# Patient Record
Sex: MALE | Race: BLACK OR AFRICAN AMERICAN | Employment: OTHER | URBAN - METROPOLITAN AREA
[De-identification: names, ages, dates, MRNs, and addresses within clinical notes are randomized per-mention and may not be internally consistent; named-entity substitution may affect disease eponyms.]

---

## 2019-03-06 ENCOUNTER — APPOINTMENT (OUTPATIENT)
Dept: CT IMAGING | Facility: CLINIC | Age: 64
End: 2019-03-06
Attending: EMERGENCY MEDICINE
Payer: COMMERCIAL

## 2019-03-06 ENCOUNTER — HOSPITAL ENCOUNTER (INPATIENT)
Facility: CLINIC | Age: 64
LOS: 1 days | Discharge: HOME | End: 2019-03-08
Attending: EMERGENCY MEDICINE | Admitting: STUDENT IN AN ORGANIZED HEALTH CARE EDUCATION/TRAINING PROGRAM
Payer: COMMERCIAL

## 2019-03-06 ENCOUNTER — APPOINTMENT (OUTPATIENT)
Dept: GENERAL RADIOLOGY | Facility: CLINIC | Age: 64
End: 2019-03-06
Attending: EMERGENCY MEDICINE
Payer: COMMERCIAL

## 2019-03-06 DIAGNOSIS — E11.65 UNCONTROLLED TYPE 2 DIABETES MELLITUS WITH HYPERGLYCEMIA (H): ICD-10-CM

## 2019-03-06 DIAGNOSIS — E78.00 HIGH CHOLESTEROL: Primary | ICD-10-CM

## 2019-03-06 DIAGNOSIS — E86.0 DEHYDRATION: ICD-10-CM

## 2019-03-06 DIAGNOSIS — R07.89 ATYPICAL CHEST PAIN: ICD-10-CM

## 2019-03-06 DIAGNOSIS — I44.7 NEW ONSET LEFT BUNDLE BRANCH BLOCK (LBBB): ICD-10-CM

## 2019-03-06 PROBLEM — R73.9 HEMOGLOBIN A1C BETWEEN 7.0% AND 9.0%: Status: ACTIVE | Noted: 2019-03-06

## 2019-03-06 LAB
ALBUMIN SERPL-MCNC: 3.8 G/DL (ref 3.4–5)
ALP SERPL-CCNC: 84 U/L (ref 40–150)
ALT SERPL W P-5'-P-CCNC: 25 U/L (ref 0–70)
ANION GAP SERPL CALCULATED.3IONS-SCNC: 10 MMOL/L (ref 3–14)
ANION GAP SERPL CALCULATED.3IONS-SCNC: 14 MMOL/L (ref 6–17)
APTT PPP: 22 SEC (ref 22–37)
AST SERPL W P-5'-P-CCNC: 15 U/L (ref 0–45)
BASOPHILS # BLD AUTO: 0.1 10E9/L (ref 0–0.2)
BASOPHILS NFR BLD AUTO: 1 %
BILIRUB SERPL-MCNC: 0.9 MG/DL (ref 0.2–1.3)
BUN SERPL-MCNC: 10 MG/DL (ref 7–30)
BUN SERPL-MCNC: 11 MG/DL (ref 7–30)
CA-I BLD-SCNC: 4.5 MG/DL (ref 4.4–5.2)
CALCIUM SERPL-MCNC: 8.7 MG/DL (ref 8.5–10.1)
CHLORIDE BLD-SCNC: 103 MMOL/L (ref 94–109)
CHLORIDE SERPL-SCNC: 105 MMOL/L (ref 94–109)
CO2 BLD-SCNC: 25 MMOL/L (ref 20–32)
CO2 SERPL-SCNC: 24 MMOL/L (ref 20–32)
CREAT BLD-MCNC: 0.8 MG/DL (ref 0.66–1.25)
CREAT SERPL-MCNC: 0.92 MG/DL (ref 0.66–1.25)
DIFFERENTIAL METHOD BLD: NORMAL
EOSINOPHIL # BLD AUTO: 0.1 10E9/L (ref 0–0.7)
EOSINOPHIL NFR BLD AUTO: 1.2 %
ERYTHROCYTE [DISTWIDTH] IN BLOOD BY AUTOMATED COUNT: 12.6 % (ref 10–15)
GFR SERPL CREATININE-BSD FRML MDRD: 89 ML/MIN/{1.73_M2}
GFR SERPL CREATININE-BSD FRML MDRD: >90 ML/MIN/{1.73_M2}
GLUCOSE BLD-MCNC: 231 MG/DL (ref 70–99)
GLUCOSE BLDC GLUCOMTR-MCNC: 216 MG/DL (ref 70–99)
GLUCOSE SERPL-MCNC: 236 MG/DL (ref 70–99)
HBA1C MFR BLD: 6.5 % (ref 0–5.6)
HCT VFR BLD AUTO: 45.6 % (ref 40–53)
HCT VFR BLD CALC: 44 %PCV (ref 40–53)
HGB BLD CALC-MCNC: 15 G/DL (ref 13.3–17.7)
HGB BLD-MCNC: 15.5 G/DL (ref 13.3–17.7)
IMM GRANULOCYTES # BLD: 0 10E9/L (ref 0–0.4)
IMM GRANULOCYTES NFR BLD: 0.4 %
INR PPP: 1.01 (ref 0.86–1.14)
INTERPRETATION ECG - MUSE: NORMAL
LACTATE BLD-SCNC: 3.5 MMOL/L (ref 0.7–2)
LIPASE SERPL-CCNC: 109 U/L (ref 73–393)
LYMPHOCYTES # BLD AUTO: 1.8 10E9/L (ref 0.8–5.3)
LYMPHOCYTES NFR BLD AUTO: 22.1 %
MAGNESIUM SERPL-MCNC: 2.2 MG/DL (ref 1.6–2.3)
MCH RBC QN AUTO: 29.7 PG (ref 26.5–33)
MCHC RBC AUTO-ENTMCNC: 34 G/DL (ref 31.5–36.5)
MCV RBC AUTO: 87 FL (ref 78–100)
MONOCYTES # BLD AUTO: 0.4 10E9/L (ref 0–1.3)
MONOCYTES NFR BLD AUTO: 4.5 %
NEUTROPHILS # BLD AUTO: 5.7 10E9/L (ref 1.6–8.3)
NEUTROPHILS NFR BLD AUTO: 70.8 %
NRBC # BLD AUTO: 0 10*3/UL
NRBC BLD AUTO-RTO: 0 /100
PHOSPHATE SERPL-MCNC: 2.6 MG/DL (ref 2.5–4.5)
PLATELET # BLD AUTO: 187 10E9/L (ref 150–450)
POTASSIUM BLD-SCNC: 3.1 MMOL/L (ref 3.4–5.3)
POTASSIUM SERPL-SCNC: 3.3 MMOL/L (ref 3.4–5.3)
PROT SERPL-MCNC: 7.3 G/DL (ref 6.8–8.8)
RBC # BLD AUTO: 5.22 10E12/L (ref 4.4–5.9)
SODIUM BLD-SCNC: 142 MMOL/L (ref 133–144)
SODIUM SERPL-SCNC: 139 MMOL/L (ref 133–144)
TROPONIN I SERPL-MCNC: <0.015 UG/L (ref 0–0.04)
TSH SERPL DL<=0.005 MIU/L-ACNC: 2.74 MU/L (ref 0.4–4)
WBC # BLD AUTO: 8 10E9/L (ref 4–11)

## 2019-03-06 PROCEDURE — 84484 ASSAY OF TROPONIN QUANT: CPT | Performed by: EMERGENCY MEDICINE

## 2019-03-06 PROCEDURE — 84484 ASSAY OF TROPONIN QUANT: CPT

## 2019-03-06 PROCEDURE — 80047 BASIC METABLC PNL IONIZED CA: CPT

## 2019-03-06 PROCEDURE — 99285 EMERGENCY DEPT VISIT HI MDM: CPT | Mod: 25

## 2019-03-06 PROCEDURE — 85025 COMPLETE CBC W/AUTO DIFF WBC: CPT | Performed by: EMERGENCY MEDICINE

## 2019-03-06 PROCEDURE — 84100 ASSAY OF PHOSPHORUS: CPT | Performed by: EMERGENCY MEDICINE

## 2019-03-06 PROCEDURE — 83036 HEMOGLOBIN GLYCOSYLATED A1C: CPT | Performed by: EMERGENCY MEDICINE

## 2019-03-06 PROCEDURE — 80053 COMPREHEN METABOLIC PANEL: CPT | Performed by: EMERGENCY MEDICINE

## 2019-03-06 PROCEDURE — 70450 CT HEAD/BRAIN W/O DYE: CPT

## 2019-03-06 PROCEDURE — 25000128 H RX IP 250 OP 636: Performed by: EMERGENCY MEDICINE

## 2019-03-06 PROCEDURE — 85610 PROTHROMBIN TIME: CPT | Performed by: EMERGENCY MEDICINE

## 2019-03-06 PROCEDURE — 96360 HYDRATION IV INFUSION INIT: CPT

## 2019-03-06 PROCEDURE — 83690 ASSAY OF LIPASE: CPT | Performed by: EMERGENCY MEDICINE

## 2019-03-06 PROCEDURE — 00000146 ZZHCL STATISTIC GLUCOSE BY METER IP

## 2019-03-06 PROCEDURE — 83605 ASSAY OF LACTIC ACID: CPT | Performed by: EMERGENCY MEDICINE

## 2019-03-06 PROCEDURE — 83735 ASSAY OF MAGNESIUM: CPT | Performed by: EMERGENCY MEDICINE

## 2019-03-06 PROCEDURE — 85730 THROMBOPLASTIN TIME PARTIAL: CPT | Performed by: EMERGENCY MEDICINE

## 2019-03-06 PROCEDURE — 71046 X-RAY EXAM CHEST 2 VIEWS: CPT

## 2019-03-06 PROCEDURE — 84443 ASSAY THYROID STIM HORMONE: CPT | Performed by: EMERGENCY MEDICINE

## 2019-03-06 PROCEDURE — 96361 HYDRATE IV INFUSION ADD-ON: CPT

## 2019-03-06 PROCEDURE — 93005 ELECTROCARDIOGRAM TRACING: CPT

## 2019-03-06 PROCEDURE — 85014 HEMATOCRIT: CPT

## 2019-03-06 RX ADMIN — SODIUM CHLORIDE 1000 ML: 9 INJECTION, SOLUTION INTRAVENOUS at 21:59

## 2019-03-06 SDOH — HEALTH STABILITY: MENTAL HEALTH: HOW OFTEN DO YOU HAVE A DRINK CONTAINING ALCOHOL?: NEVER

## 2019-03-06 ASSESSMENT — ENCOUNTER SYMPTOMS
VOMITING: 1
DIAPHORESIS: 1
NAUSEA: 1
ABDOMINAL PAIN: 0
WEAKNESS: 1
HEADACHES: 0
DIARRHEA: 0
CONSTIPATION: 0
SHORTNESS OF BREATH: 0

## 2019-03-07 ENCOUNTER — APPOINTMENT (OUTPATIENT)
Dept: CARDIOLOGY | Facility: CLINIC | Age: 64
End: 2019-03-07
Attending: STUDENT IN AN ORGANIZED HEALTH CARE EDUCATION/TRAINING PROGRAM
Payer: COMMERCIAL

## 2019-03-07 ENCOUNTER — APPOINTMENT (OUTPATIENT)
Dept: NUCLEAR MEDICINE | Facility: CLINIC | Age: 64
End: 2019-03-07
Attending: INTERNAL MEDICINE
Payer: COMMERCIAL

## 2019-03-07 PROBLEM — R07.9 CHEST PAIN: Status: ACTIVE | Noted: 2019-03-07

## 2019-03-07 LAB
ALBUMIN UR-MCNC: NEGATIVE MG/DL
AMPHETAMINES UR QL SCN: NEGATIVE
APPEARANCE UR: ABNORMAL
BARBITURATES UR QL: NEGATIVE
BENZODIAZ UR QL: NEGATIVE
BILIRUB UR QL STRIP: NEGATIVE
CANNABINOIDS UR QL SCN: NEGATIVE
CHOLEST SERPL-MCNC: 174 MG/DL
COCAINE UR QL: NEGATIVE
COLOR UR AUTO: YELLOW
GLUCOSE BLDC GLUCOMTR-MCNC: 101 MG/DL (ref 70–99)
GLUCOSE BLDC GLUCOMTR-MCNC: 114 MG/DL (ref 70–99)
GLUCOSE BLDC GLUCOMTR-MCNC: 156 MG/DL (ref 70–99)
GLUCOSE UR STRIP-MCNC: 50 MG/DL
HDLC SERPL-MCNC: 41 MG/DL
HGB UR QL STRIP: NEGATIVE
INTERPRETATION ECG - MUSE: NORMAL
KETONES UR STRIP-MCNC: 20 MG/DL
LDLC SERPL CALC-MCNC: 117 MG/DL
LEUKOCYTE ESTERASE UR QL STRIP: NEGATIVE
MUCOUS THREADS #/AREA URNS LPF: PRESENT /LPF
NITRATE UR QL: NEGATIVE
NONHDLC SERPL-MCNC: 133 MG/DL
OPIATES UR QL SCN: NEGATIVE
PCP UR QL SCN: NEGATIVE
PH UR STRIP: 7 PH (ref 5–7)
POTASSIUM SERPL-SCNC: 4.1 MMOL/L (ref 3.4–5.3)
RBC #/AREA URNS AUTO: 1 /HPF (ref 0–2)
SOURCE: ABNORMAL
SP GR UR STRIP: 1.02 (ref 1–1.03)
SQUAMOUS #/AREA URNS AUTO: 1 /HPF (ref 0–1)
TRIGL SERPL-MCNC: 82 MG/DL
TROPONIN I BLD-MCNC: 0 UG/L (ref 0–0.08)
TROPONIN I SERPL-MCNC: <0.015 UG/L (ref 0–0.04)
UROBILINOGEN UR STRIP-MCNC: NEGATIVE MG/DL (ref 0–2)
WBC #/AREA URNS AUTO: 4 /HPF (ref 0–5)

## 2019-03-07 PROCEDURE — 36415 COLL VENOUS BLD VENIPUNCTURE: CPT | Performed by: STUDENT IN AN ORGANIZED HEALTH CARE EDUCATION/TRAINING PROGRAM

## 2019-03-07 PROCEDURE — 93010 ELECTROCARDIOGRAM REPORT: CPT | Performed by: INTERNAL MEDICINE

## 2019-03-07 PROCEDURE — 00000146 ZZHCL STATISTIC GLUCOSE BY METER IP

## 2019-03-07 PROCEDURE — 96361 HYDRATE IV INFUSION ADD-ON: CPT

## 2019-03-07 PROCEDURE — 25500064 ZZH RX 255 OP 636: Performed by: STUDENT IN AN ORGANIZED HEALTH CARE EDUCATION/TRAINING PROGRAM

## 2019-03-07 PROCEDURE — 78452 HT MUSCLE IMAGE SPECT MULT: CPT

## 2019-03-07 PROCEDURE — 93016 CV STRESS TEST SUPVJ ONLY: CPT | Performed by: INTERNAL MEDICINE

## 2019-03-07 PROCEDURE — 93017 CV STRESS TEST TRACING ONLY: CPT

## 2019-03-07 PROCEDURE — 25000132 ZZH RX MED GY IP 250 OP 250 PS 637: Performed by: STUDENT IN AN ORGANIZED HEALTH CARE EDUCATION/TRAINING PROGRAM

## 2019-03-07 PROCEDURE — 99223 1ST HOSP IP/OBS HIGH 75: CPT | Mod: AI | Performed by: STUDENT IN AN ORGANIZED HEALTH CARE EDUCATION/TRAINING PROGRAM

## 2019-03-07 PROCEDURE — 81001 URINALYSIS AUTO W/SCOPE: CPT | Performed by: EMERGENCY MEDICINE

## 2019-03-07 PROCEDURE — 25800030 ZZH RX IP 258 OP 636: Performed by: EMERGENCY MEDICINE

## 2019-03-07 PROCEDURE — 93005 ELECTROCARDIOGRAM TRACING: CPT

## 2019-03-07 PROCEDURE — 84484 ASSAY OF TROPONIN QUANT: CPT | Performed by: STUDENT IN AN ORGANIZED HEALTH CARE EDUCATION/TRAINING PROGRAM

## 2019-03-07 PROCEDURE — 80307 DRUG TEST PRSMV CHEM ANLYZR: CPT | Performed by: EMERGENCY MEDICINE

## 2019-03-07 PROCEDURE — 80061 LIPID PANEL: CPT | Performed by: STUDENT IN AN ORGANIZED HEALTH CARE EDUCATION/TRAINING PROGRAM

## 2019-03-07 PROCEDURE — 99222 1ST HOSP IP/OBS MODERATE 55: CPT | Mod: 25 | Performed by: INTERNAL MEDICINE

## 2019-03-07 PROCEDURE — A9502 TC99M TETROFOSMIN: HCPCS | Performed by: STUDENT IN AN ORGANIZED HEALTH CARE EDUCATION/TRAINING PROGRAM

## 2019-03-07 PROCEDURE — 40000264 ECHOCARDIOGRAM COMPLETE

## 2019-03-07 PROCEDURE — 93018 CV STRESS TEST I&R ONLY: CPT | Performed by: INTERNAL MEDICINE

## 2019-03-07 PROCEDURE — 34300033 ZZH RX 343: Performed by: STUDENT IN AN ORGANIZED HEALTH CARE EDUCATION/TRAINING PROGRAM

## 2019-03-07 PROCEDURE — 84132 ASSAY OF SERUM POTASSIUM: CPT | Performed by: STUDENT IN AN ORGANIZED HEALTH CARE EDUCATION/TRAINING PROGRAM

## 2019-03-07 PROCEDURE — 25000128 H RX IP 250 OP 636

## 2019-03-07 PROCEDURE — 12000000 ZZH R&B MED SURG/OB

## 2019-03-07 PROCEDURE — 93306 TTE W/DOPPLER COMPLETE: CPT | Mod: 26 | Performed by: INTERNAL MEDICINE

## 2019-03-07 PROCEDURE — 78452 HT MUSCLE IMAGE SPECT MULT: CPT | Mod: 26 | Performed by: INTERNAL MEDICINE

## 2019-03-07 PROCEDURE — 25000132 ZZH RX MED GY IP 250 OP 250 PS 637: Performed by: INTERNAL MEDICINE

## 2019-03-07 RX ORDER — GABAPENTIN 300 MG/1
CAPSULE ORAL
Status: ON HOLD | COMMUNITY
Start: 2018-11-07 | End: 2019-03-07

## 2019-03-07 RX ORDER — REGADENOSON 0.08 MG/ML
INJECTION, SOLUTION INTRAVENOUS
Status: COMPLETED
Start: 2019-03-07 | End: 2019-03-07

## 2019-03-07 RX ORDER — POTASSIUM CHLORIDE 29.8 MG/ML
20 INJECTION INTRAVENOUS
Status: DISCONTINUED | OUTPATIENT
Start: 2019-03-07 | End: 2019-03-07 | Stop reason: CLARIF

## 2019-03-07 RX ORDER — IBUPROFEN 800 MG/1
800 TABLET, FILM COATED ORAL
Status: ON HOLD | COMMUNITY
Start: 2018-09-12 | End: 2019-03-07

## 2019-03-07 RX ORDER — ACETAMINOPHEN 500 MG
1000 TABLET ORAL EVERY 8 HOURS PRN
Status: DISCONTINUED | OUTPATIENT
Start: 2019-03-07 | End: 2019-03-08 | Stop reason: HOSPADM

## 2019-03-07 RX ORDER — POTASSIUM CHLORIDE 1500 MG/1
20-40 TABLET, EXTENDED RELEASE ORAL
Status: DISCONTINUED | OUTPATIENT
Start: 2019-03-07 | End: 2019-03-08 | Stop reason: HOSPADM

## 2019-03-07 RX ORDER — POTASSIUM CHLORIDE 1.5 G/1.58G
20-40 POWDER, FOR SOLUTION ORAL
Status: DISCONTINUED | OUTPATIENT
Start: 2019-03-07 | End: 2019-03-08 | Stop reason: HOSPADM

## 2019-03-07 RX ORDER — CHOLECALCIFEROL (VITAMIN D3) 50 MCG
1 TABLET ORAL DAILY PRN
COMMUNITY

## 2019-03-07 RX ORDER — ACYCLOVIR 200 MG/1
0-1 CAPSULE ORAL
Status: DISCONTINUED | OUTPATIENT
Start: 2019-03-07 | End: 2019-03-08 | Stop reason: HOSPADM

## 2019-03-07 RX ORDER — ATORVASTATIN CALCIUM 40 MG/1
40 TABLET, FILM COATED ORAL DAILY
Status: DISCONTINUED | OUTPATIENT
Start: 2019-03-07 | End: 2019-03-08 | Stop reason: HOSPADM

## 2019-03-07 RX ORDER — ALBUTEROL SULFATE 90 UG/1
2 AEROSOL, METERED RESPIRATORY (INHALATION) EVERY 5 MIN PRN
Status: DISCONTINUED | OUTPATIENT
Start: 2019-03-07 | End: 2019-03-08 | Stop reason: HOSPADM

## 2019-03-07 RX ORDER — NALOXONE HYDROCHLORIDE 0.4 MG/ML
.1-.4 INJECTION, SOLUTION INTRAMUSCULAR; INTRAVENOUS; SUBCUTANEOUS
Status: DISCONTINUED | OUTPATIENT
Start: 2019-03-07 | End: 2019-03-08 | Stop reason: HOSPADM

## 2019-03-07 RX ORDER — FLUTICASONE PROPIONATE 50 MCG
2 SPRAY, SUSPENSION (ML) NASAL DAILY
COMMUNITY
Start: 2018-04-10

## 2019-03-07 RX ORDER — ACETAMINOPHEN 325 MG/1
650 TABLET ORAL EVERY 4 HOURS PRN
Status: DISCONTINUED | OUTPATIENT
Start: 2019-03-07 | End: 2019-03-08 | Stop reason: HOSPADM

## 2019-03-07 RX ORDER — POTASSIUM CL/LIDO/0.9 % NACL 10MEQ/0.1L
10 INTRAVENOUS SOLUTION, PIGGYBACK (ML) INTRAVENOUS
Status: DISCONTINUED | OUTPATIENT
Start: 2019-03-07 | End: 2019-03-08 | Stop reason: HOSPADM

## 2019-03-07 RX ORDER — POTASSIUM CHLORIDE 7.45 MG/ML
10 INJECTION INTRAVENOUS
Status: DISCONTINUED | OUTPATIENT
Start: 2019-03-07 | End: 2019-03-08 | Stop reason: HOSPADM

## 2019-03-07 RX ORDER — REGADENOSON 0.08 MG/ML
0.4 INJECTION, SOLUTION INTRAVENOUS ONCE
Status: COMPLETED | OUTPATIENT
Start: 2019-03-07 | End: 2019-03-07

## 2019-03-07 RX ORDER — AMINOPHYLLINE 25 MG/ML
50-100 INJECTION, SOLUTION INTRAVENOUS
Status: DISCONTINUED | OUTPATIENT
Start: 2019-03-07 | End: 2019-03-08 | Stop reason: HOSPADM

## 2019-03-07 RX ORDER — ASCORBIC ACID 125 MG
1 TABLET,CHEWABLE ORAL DAILY PRN
COMMUNITY

## 2019-03-07 RX ORDER — ASPIRIN 81 MG/1
81 TABLET, CHEWABLE ORAL DAILY
Status: DISCONTINUED | OUTPATIENT
Start: 2019-03-07 | End: 2019-03-08 | Stop reason: HOSPADM

## 2019-03-07 RX ADMIN — HUMAN ALBUMIN MICROSPHERES AND PERFLUTREN 2 ML: 10; .22 INJECTION, SOLUTION INTRAVENOUS at 09:04

## 2019-03-07 RX ADMIN — REGADENOSON 0.4 MG: 0.08 INJECTION, SOLUTION INTRAVENOUS at 14:29

## 2019-03-07 RX ADMIN — TETROFOSMIN 9.8 MCI.: 1.38 INJECTION, POWDER, LYOPHILIZED, FOR SOLUTION INTRAVENOUS at 13:00

## 2019-03-07 RX ADMIN — ATORVASTATIN CALCIUM 40 MG: 40 TABLET, FILM COATED ORAL at 11:04

## 2019-03-07 RX ADMIN — POTASSIUM CHLORIDE 20 MEQ: 1500 TABLET, EXTENDED RELEASE ORAL at 04:31

## 2019-03-07 RX ADMIN — TETROFOSMIN 30.6 MCI.: 1.38 INJECTION, POWDER, LYOPHILIZED, FOR SOLUTION INTRAVENOUS at 14:38

## 2019-03-07 RX ADMIN — ASPIRIN 81 MG 81 MG: 81 TABLET ORAL at 11:04

## 2019-03-07 RX ADMIN — SODIUM CHLORIDE, POTASSIUM CHLORIDE, SODIUM LACTATE AND CALCIUM CHLORIDE 1000 ML: 600; 310; 30; 20 INJECTION, SOLUTION INTRAVENOUS at 00:00

## 2019-03-07 RX ADMIN — POTASSIUM CHLORIDE 40 MEQ: 1500 TABLET, EXTENDED RELEASE ORAL at 02:06

## 2019-03-07 ASSESSMENT — ACTIVITIES OF DAILY LIVING (ADL)
FALL_HISTORY_WITHIN_LAST_SIX_MONTHS: NO
TRANSFERRING: 0-->INDEPENDENT
RETIRED_EATING: 0-->INDEPENDENT
ADLS_ACUITY_SCORE: 14
COGNITION: 0 - NO COGNITION ISSUES REPORTED
RETIRED_COMMUNICATION: 0-->UNDERSTANDS/COMMUNICATES WITHOUT DIFFICULTY
TOILETING: 0-->INDEPENDENT
DRESS: 0-->INDEPENDENT
ADLS_ACUITY_SCORE: 12
ADLS_ACUITY_SCORE: 12
BATHING: 0-->INDEPENDENT
AMBULATION: 0-->INDEPENDENT
ADLS_ACUITY_SCORE: 12
SWALLOWING: 0-->SWALLOWS FOODS/LIQUIDS WITHOUT DIFFICULTY
ADLS_ACUITY_SCORE: 14

## 2019-03-07 ASSESSMENT — MIFFLIN-ST. JEOR: SCORE: 1456.27

## 2019-03-07 NOTE — PROGRESS NOTES
Pre-procedure:  Are you having any pain or shortness of breath (prior to starting)? n  Initial vital signs: /62, HR 63, RR 18  Allergies reviewed: y   Rhythm: Sinus  Medications taken within 48 hours of procedure:    Sublingual nitro within the last 48 hours:  Last Caffeine:   Lung sounds: CTA, no wheezing, crackles or rtx  Health History (COPD, Asthma, etc):            Procedure: Lexiscan  Reaction/symptoms after receiving Wanda injection: Shortness of breath  Intensity of Pain: 0  Rhythm: SR  1. Vital Signs:/67, HR 94, RR 18  2. Vital Signs:/65, HR 82, RR 18     Reversal agent: N/A    Post:   Resolution of symptoms?: YES  Vital signs: /69, HR 80, RR 18  Vital signs: /67, HR 74, RR 18  Rhythm: sr  Walk: YES  Comment:   Return to Radiology

## 2019-03-07 NOTE — ED PROVIDER NOTES
"  History     Chief Complaint:  Chest Pain    HPI   Benito Silva is a 63 year old male with a history of hyperlipidemia who presents via EMS to the Emergency Department today for evaluation of chest pain. His blood sugar was 216. He feels weak. He is nauseous and was given 4 MG Zofran by EMS. He denies chest pain to EMS. He was given O2. He last saw his physician two months ago. EMS reports that the patient has no significant medical history, no allergies, nor is on any medications. Patient reports he began to feel poorly around 8 PM tonight but has difficulty describing what this felt like. He is able to say that he just feels \"weak and bad\". Patient reports his whole body feels weak and he reports some chest pressure. He feels pressure in his right ear. He was diaphoretic and vomited earlier today when symptoms started. He is no longer nauseous after Zofran by EMS. No headache. No shortness of breath. No abdominal pain. No constipation or diarrhea. Patient reports he has never been sick like this. He denies any fever or recent illness. He denies any other symptoms. No focal numbness, tingling, or weakness.    Allergies:  No known drug allergies    Medications:    Gabapentin    Past Medical History:    Kidney stone  Vitamin D deficiency  Chondromalacia of patella  Mantoux  Hyperlipidemia  GERD  Allergic rhinitis  Asthma    Past Surgical History:    History reviewed. No pertinent surgical history.    Family History:    Asthma    Social History:  Smoking status: Former smoker  Alcohol use: No  Marital Status:  Single [1]     Review of Systems   Constitutional: Positive for diaphoresis.   HENT: Positive for ear pain.    Respiratory: Negative for shortness of breath.    Cardiovascular: Positive for chest pain.   Gastrointestinal: Positive for nausea and vomiting. Negative for abdominal pain, constipation and diarrhea.   Neurological: Positive for weakness. Negative for headaches.   All other systems reviewed and " are negative.    Physical Exam     Patient Vitals for the past 24 hrs:   BP Temp Temp src Pulse Heart Rate Resp SpO2   03/07/19 0020 130/77 -- -- 64 73 -- 100 %   03/07/19 0000 121/72 -- -- 61 63 -- 100 %   03/06/19 2320 135/70 -- -- 57 73 -- 100 %   03/06/19 2303 -- -- -- -- 67 -- 100 %   03/06/19 2300 146/67 -- -- 66 66 -- 100 %   03/06/19 2200 132/71 -- -- 58 60 -- 99 %   03/06/19 2118 -- 97.6  F (36.4  C) Temporal -- -- -- 100 %   03/06/19 2113 -- -- -- -- 100 -- --   03/06/19 2112 139/73 -- Oral -- 50 12 --       Physical Exam  General: Nontoxic. Patient appears fatigued.  Head:  Scalp, face, and head appear normal  Eyes:  Pupils are equal, round, and reactive to light    Conjunctivae non-injected and sclerae white  ENT:    The external nose is normal    Pinnae are normal    The oropharynx is normal, mucous membranes moist    Uvula is in the midline  Neck:  Normal range of motion    There is no rigidity noted    Trachea is in the midline  CV:  Regular rate and rhythm     Normal S1/S2, no S3/S4    No murmur or rub. Radial pulses 2+ bilaterally  Resp:  Lungs are clear and equal bilaterally    There is no tachypnea    No increased work of breathing    No rales, wheezing, or rhonchi  GI:  Abdomen is soft, no rigidity or guarding    No distension, or mass    No tenderness or rebound tenderness   MS:  Normal muscular tone    Symmetric motor strength    No lower extremity edema  Skin:  No rash or acute skin lesions noted  Neuro: Awake and alert    CN II-XII intact.    Strength 5/5 and intact throughout. SILT throughout.    Speech is normal and fluent    Moves all extremities spontaneously  Psych:  Normal affect.  Appropriate interactions.     Emergency Department Course     ECG (21:25:15):  Rate 51 bpm. ID interval 138. QRS duration 154. QT/QTc 532/490. P-R-T axes 42 23 71. Sinus bradycardia. Left bundle branch block. Abnormal ECG. Interpreted at 2127 by Ayaz Middleton MD.    Imaging:  Radiographic findings were  communicated with the patient who voiced understanding of the findings.  XR Chest 2 Views  IMPRESSION: Normal. As read by radiology.  CT Head w/o contrast  IMPRESSION: Mild cerebral atrophy. No evidence for intracranial  hemorrhage or any acute process. As read by radiology.    Laboratory:  CBC: WNL (WBC 8.0, HGB 15.5, )  CMP: Potassium 3.3 (L), Glucose 236 (H) o/w WNL (Creatinine 0.92)  2118: ISTAT basic met ICA hematocrit POCT: Potassium 3.1 (L), Glucose 231 (H), o/w WNL  Lipase: 109    Magnesium: 2.2  Phosphorus: 2.6    Hemoglobin A1c: 6.5% (H)    2116: Glucose by meter: 216 (H)    INR: 1.01  PTT: 22    Troponin : <0.015    TSH with free T4: 2.74    2117: Lactic acid whole blood: 3.5 (H)    UA: Pending    Drug abuse screen 77 urine: Pending    Interventions:  2159: NS 1L IV Bolus  0000: Lactated ringer bolus 1L IV    Emergency Department Course:  2108: Upon arriving to the ED, the patient was immediately placed in a critical care room.  Past medical records, nursing notes, and vitals reviewed. I performed an exam of the patient and obtained history, as documented above.    2110: ECG obtained    2110: IV inserted and blood drawn. The patient was placed on continuous cardiac monitoring and pulse oximetry.    The patient was sent for a chest x-ray and head CT while in the emergency department, findings above.    2350: I rechecked the patient. Explained findings to the patient.    Findings and plan explained to the Patient who consents to admission.     0023: Discussed the patient with Dr. Ojeda, who will admit the patient to a [facility] bed for further monitoring, evaluation, and treatment.     Impression & Plan      CMS Diagnoses: The Lactic acid level is elevated due to dehydration, at this time there is no sign of severe sepsis or septic shock.    Medical Decision Making:  Benito Silva is a 63 year old gentleman history of untreated diabetes which was recently diagnosed by his PCP who  presents after an  episode of profound sudden onset of weakness, near syncope and atypical chest pain. On my evaluation, patient is hemodynamically stable. Initial presenting rhythm is normal sinus rhythm with a left bundle branch block. Patient has no prior history of cardiovascular disease that he is aware of. He is otherwise hemodynamically stable. He has no focal neurologic deficits and had no complete syncope, fall, or trauma. Patient states that he feels profoundly fatigued which is new for him. ISTAT troponin at the bedside was negative. Broad ED work up was initiated. CMP significant for mild hypokalemia and significant hyperglycemia, otherwise normal. Initial troponin was negative. TSH was normal. Magnesium normal. CBC unremarkable. Initial lactic acid was elevated at 3.5. Clinically he appears dehydrated. He has no evidence of sepsis or infection at this time. He is afebrile. EKG was obtained and negative for Sgarbossa criteria. He does have a left bundle branch block which is presumably new and concerning in the setting of his near syncope and chest pain. However, given the concern for possible underlying neurologic process CT scan of the head was obtained which was unremarkable. Chest x-ray was obtained and also negative. Given his ongoing chest discomfort, new left bundle branch block, dehydration patient will be admitted to the hospital for further monitoring, evaluation, and treatment as he will likely require further cardiac evaluation. Patient was agreeable to the plan of care and he was admitted in stable condition.    Critical Care time:  none    Diagnosis:    ICD-10-CM   1. New onset left bundle branch block (LBBB) I44.7   2. Atypical chest pain R07.89   3. Uncontrolled type 2 diabetes mellitus with hyperglycemia (H) E11.65   4. Dehydration E86.0     Disposition:  Admitted    Lydia Carolynn  3/6/2019   [facility] EMERGENCY DEPARTMENT  Scribe Disclosure:  Lydia SAHA, am serving as a scribe at 9:08  PM on 3/6/2019 to document services personally performed by Ayaz Middleton MD based on my observations and the provider's statements to me.        Ayaz Middleton MD  03/08/19 4097

## 2019-03-07 NOTE — PHARMACY-ADMISSION MEDICATION HISTORY
Admission medication history interview status for this patient is complete. See [facility] admission navigator for allergy information, prior to admission medications and immunization status.     Medication history interview source(s):Patient  Medication history resources (including written lists, pill bottles, clinic record):None  Primary pharmacy: [facility] in [place]    Changes made to [facility] medication list:  Added:   - chewable adult multivitamin, takes 1 tab po every day prn   - vitamin d 2000IU, takes 1 tablet po every day prn  Deleted: eucalyptus, gabapentin, and ibuprofen  Changed: none    Actions taken by pharmacist (provider contacted, etc):None     Additional medication history information:  Patient reports that he does not like taking medications and that he is not adherent to his medications    Medication reconciliation/reorder completed by provider prior to medication history? No    Prior to Admission medications    Medication Sig Last Dose Taking? Auth Provider   fluticasone (FLONASE) 50 MCG/ACT nasal spray Spray 2 sprays in nostril  Yes Reported, Patient   Multiple Vitamins-Minerals (MULTI ADULT GUMMIES) CHEW Take 1 tablet by mouth daily as needed  Yes Unknown, Entered By History   vitamin D3 (CHOLECALCIFEROL) 2000 units tablet Take 1 tablet by mouth daily as needed 2/28/2019 Yes Unknown, Entered By History     Delfin Tolliver, PharmD IV Student

## 2019-03-07 NOTE — ED NOTES
[facility]  ED Nurse Handoff Report    Benito Sivla is a 63 year old male   ED Chief complaint: Chest Pain  . ED Diagnosis:   Final diagnoses:   New onset left bundle branch block (LBBB)   Atypical chest pain   Uncontrolled type 2 diabetes mellitus with hyperglycemia (H)   Dehydration     Allergies: No Known Allergies    Code Status: Full Code  Activity level - Baseline/Home:  Independent. Activity Level - Current:   Stand with Assist. Lift room needed: No. Bariatric: No   Needed: No   Isolation: No. Infection: Not Applicable.     Vital Signs:   Vitals:    03/06/19 2200 03/06/19 2300 03/06/19 2303 03/06/19 2320   BP: 132/71 146/67  135/70   Pulse: 58 66  57   Resp:       Temp:       TempSrc:       SpO2: 99% 100% 100% 100%       Cardiac Rhythm:  ,   Cardiac  Cardiac Rhythm: Other (Comment)(LBBB, bradycardia)  Pain level:    Patient confused: No. Patient Falls Risk: Yes.   Elimination Status: Has voided   Patient Report - Initial Complaint: CP. Focused Assessment:   21:10 ED Triage Notes Filed Denies major medical hx, 2 months prior normal annual exam.  Sudden onset at dinner of diaphoresis, CP, dizziness, nausea with emesis. bs 260. 18 PIV in place, HTN, HR 50s, New LBBB. MD at bedside on arrival Emi. Sleepy, arousable     22:01 Cardiac Cardiac - Cardiac WDL: -WDL except (Sudden onset CP, dizziness, near syncopy, fatigue, N/V, New LBBB )  Chest Pain Assessment - Precipitating Factors: other (see comments) (eating) Associated Signs/Symptoms: nausea; pallor; dizziness; hypertension; sense of doom  Cardiac Monitoring - EKG Monitoring: Yes Cardiac Regularity: Regular Cardiac Rhythm: Other (Comment) (LBBB, bradycardia) J     22:01 Respiratory Respiratory - Respiratory WDL: WDL      Inquired with Emi ISIDRO for Blood cultures due to increased Lactic, MD Middleton reports to hold at this time, no SIRS criteria  Also FYI Pt has Hx TB 2009, 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 No respiratory symptoms or  concerns at this time. LS clear no coughing  Tests Performed: labs, imaging. Abnormal Results:   Labs Ordered and Resulted from Time of ED Arrival Up to the Time of Departure from the ED   COMPREHENSIVE METABOLIC PANEL - Abnormal; Notable for the following components:       Result Value    Potassium 3.3 (*)     Glucose 236 (*)     All other components within normal limits   LACTIC ACID WHOLE BLOOD - Abnormal; Notable for the following components:    Lactic Acid 3.5 (*)     All other components within normal limits   HEMOGLOBIN A1C - Abnormal; Notable for the following components:    Hemoglobin A1C 6.5 (*)     All other components within normal limits   GLUCOSE BY METER - Abnormal; Notable for the following components:    Glucose 216 (*)     All other components within normal limits   ISTAT BASIC MET ICA HCT POCT - Abnormal; Notable for the following components:    Potassium 3.1 (*)     Glucose 231 (*)     All other components within normal limits   GLUCOSE MONITOR NURSING POCT   CBC WITH PLATELETS DIFFERENTIAL   INR   PARTIAL THROMBOPLASTIN TIME   LIPASE   TROPONIN I   TSH WITH FREE T4 REFLEX   MAGNESIUM   PHOSPHORUS   UA MACROSCOPIC WITH REFLEX TO MICRO AND CULTURE   DRUG ABUSE SCREEN 77 URINE (FL, RH, SH)   PERIPHERAL IV CATHETER   IV ACCESS   CARDIAC CONTINUOUS MONITORING   ISTAT GAS OR ELECTROLYTE NURSING POCT   ISTAT TROPONIN NURSING POCT     XR Chest 2 Views   Final Result   IMPRESSION: Normal.      GALE BARKER MD      CT Head w/o Contrast   Final Result   IMPRESSION: Mild cerebral atrophy. No evidence for intracranial   hemorrhage or any acute process.      GALE BARKER MD        .   Treatments provided: fluids, monitoring  Family Comments: at bedside  OBS brochure/video discussed/provided to patient:  No  ED Medications:   Medications   lactated ringers BOLUS 1,000 mL (1,000 mLs Intravenous New Bag 3/7/19 0000)   0.9% sodium chloride BOLUS (0 mLs Intravenous Stopped 3/7/19 0025)     Drips infusing:   Yes  For the majority of the shift, the patient's behavior Green. Interventions performed were n/a.     Severe Sepsis OR Septic Shock Diagnosis Present: No      ED Nurse Name/Phone Number: Kavitha Chang,   12:27 AM    RECEIVING UNIT ED HANDOFF REVIEW    Above ED Nurse Handoff Report was reviewed: Yes  Reviewed by: Danni Fernandez on March 7, 2019 at 1:19 AM

## 2019-03-07 NOTE — CONSULTS
"Cardiology consultation dictated    Isolated episode of lightheadedness/near syncope with newly diagnosed LBBB in setting of diabetes, dyslipidemia and self-reported \"dehydration\". I elicit NO history of chest pain. He denies hypertension, old MI, current smoking. Troponin levels normal    Recommendation  1) His  10 year ACC risk for adverse vascular event given current age, diabetes, and dyslipidemia is 15.6% . Current guidelines would favor high intensity statin. There is controversy about benefit of asa, however [facility] would favor low dose asa in absence of contraindication.     2) agree with TTE    3) Monitor for symptomatic bradycardia/pause in view of LBBB and near syncope    4) Regadnoson nuclear stress test. If LVEF normal and no ischemia, no further testing for CAD. If LV dysfunction on TTE and/or ischemia CAG    Manoles  "

## 2019-03-07 NOTE — ED TRIAGE NOTES
Denies major medical hx, 2 months prior normal annual exam.  Sudden onset at dinner of diaphoresis, CP, dizziness, nausea with emesis. bs 260. 18 PIV in place, HTN, HR 50s, New LBBB. MD at bedside on arrival Emi. Sleepy, arousable

## 2019-03-07 NOTE — PLAN OF CARE
A&O x 4, stand by assist, NPO except ice chips, echo to be done today, tele SR w/prolonged QT, potassium 60 mEq replaced during shift, blood draw scheduled for 0800, no complaints of chest pain or SOB, profile not fully completed, family at bedside, will continue with POC.

## 2019-03-07 NOTE — PLAN OF CARE
.  Admission Diagnosis: Chest pain   Orientation: A&Ox4  VS: Stable,   O2: WDL Room air  Tele: SR Left BBB  LS: clear   BS: Positive, NPO, ICE Chips  : Voiding WDL  Skin: WDL  Activity: SBA  IVF: Saline lock 2 PIV's  Pain: C/o abd pain this am, no intervention, denies pain this evening   Plan:  lexiscan stress test, possible angio, cards following.

## 2019-03-07 NOTE — PLAN OF CARE
Tele tech called with concerns of ST depression greater than it was earlier today. MD paged.   Appetite good, patient ate all of family provided meal. No c/o pain, no c/o chest pain.

## 2019-03-07 NOTE — H&P
[facility]    History and Physical - Hospitalist Service       Date of Admission:  3/6/2019    Assessment & Plan      Benito Silva is a 63 year old male admitted on 3/6/2019. He presents with chest pain.       Chest Pain    Left Bundle Branch Block    Assessment: Presents with sudden onset chest pain.  EKG on admission shows sinus bradycardia with what appears to be new left bundle branch block, does not meet Sgarbossa criteria.  Initial troponin was negative.  Chest x-ray showed no acute airspace disease.  At this time overall symptoms are concerning for undiagnosed coronary artery disease.  Will need ACS rule out.    Plan:  - Admit to inpatient  - Repeat troponin in AM  - Telemetry  - ECHO  - Cardiology consult  - NPO  - IVF  - Check Lipid panel in AM      GERD (gastroesophageal reflux disease)    Assessment/Plan: stable, not on PPI      High cholesterol    AssessmentPlan: not on statin, repeat lipid panel in AM      Mild intermittent asthma    Assessment: stable, no hypoxia/wheezing    Plan:   - Follow clinically      Diabetes Mellitus    Hemoglobin A1c between 7.0% and 9.0%    Assessment: most recent A1c 11/2018 was 7.6    Plan:   - Accuchecks    Chronic Left Shoulder Pain  Assessment/Plan: continue PTA Gabapentin once verified by pharmacy       Diet: NPO   DVT Prophylaxis: Pneumatic Compression Devices  Mejia Catheter: not present  Code Status: FULL CODE    Disposition Plan   Expected discharge: 2 - 3 days, recommended to prior living arrangement once Cardiology evaluation.  Entered: Messi Ojeda MD 03/07/2019, 12:46 AM     The patient's care was discussed with the Patient and ED provider.    Messi Ojeda MD  [facility]    ______________________________________________________________________    Chief Complaint     Chest Pain    History is obtained from the patient    History of Present Illness      Benito Silva is a 63 year old male with PMH of diabetes mellitus who presents  for evaluation of chest pain.    Patient reports that he felt relatively at his baseline state of health until this evening when he felt more under the weather/progressive weakness.  He also endorses centralized substernal chest pain with no radiation.  Rates it at a 3 out of 10, pressure-like in quality.  He also felt diaphoretic and vomited earlier in the day.  But not while he was having chest pain.  He denies any shortness of breath or fevers.  He denies any abdominal pain, no constipation or diarrhea.  He denies any wheezing.  He reports that his chest pain has improved.  He denies any prior history of cardiac disease in him or his family.  He reports that he has never been this ill and has never been hospitalized in the past.  He denies a cough, lower extremity swelling, PND/orthopnea.  He has no other complaints at this time.    Review of Systems    The 10 point Review of Systems is negative other than noted in the HPI or here.     Past Medical History    I have reviewed this patient's medical history and updated it with pertinent information if needed.   Past Medical History:   Diagnosis Date     Hemoglobin A1c between 7.0% and 9.0% 3/6/2019     Past Surgical History   I have reviewed this patient's surgical history and updated it with pertinent information if needed.  History reviewed. No pertinent surgical history.    Social History   I have reviewed this patient's social history and updated it with pertinent information if needed.  Social History     Tobacco Use     Smoking status: Never Smoker     Smokeless tobacco: Never Used   Substance Use Topics     Alcohol use: No     Frequency: Never     Drug use: No       Family History   I have reviewed this patient's family history and updated it with pertinent information if needed.   Family History   Problem Relation Age of Onset     Asthma Mother      Prior to Admission Medications   Prior to Admission Medications   Prescriptions Last Dose Informant Patient  Reported? Taking?   Eucalyptus (SOLEDUM PO)   Yes Yes   Sig: Use 2-3 drops in each nostril in the morning and at bedtime.   fluticasone (FLONASE) 50 MCG/ACT nasal spray   Yes Yes   Sig: Spray 2 sprays in nostril   gabapentin (NEURONTIN) 300 MG capsule   Yes Yes   Sig: Take 1 tablet in the morning, 2 tablets in the afternoon and evening   ibuprofen (ADVIL/MOTRIN) 800 MG tablet   Yes Yes   Sig: Take 800 mg by mouth      Facility-Administered Medications: None     Allergies   No Known Allergies    Physical Exam   Vital Signs: Temp: 97.6  F (36.4  C) Temp src: Temporal BP: 130/77 Pulse: 64 Heart Rate: 73 Resp: 12 SpO2: 100 % O2 Device: None (Room air)    Weight: 0 lbs 0 oz    Constitutional: awake, alert, cooperative, no apparent distress, and appears stated age  Eyes: Lids and lashes normal, pupils equal, round and reactive to light  ENT: Normocephalic, without obvious abnormality, atraumatic, sinuses nontender on palpation, external ears without lesions.  Hematologic / Lymphatic: no cervical lymphadenopathy  Respiratory: CTABL  Cardiovascular: RRR with normal S1 and S2, no S3 or S4, and no murmur noted  GI: No scars, normal bowel sounds, soft, non-distended, non-tender, no masses palpated, no hepatosplenomegally   Skin: normal skin color, texture, turgor  Musculoskeletal: There is no redness, warmth, or swelling of the joints.  Full range of motion noted.  Motor strength is 5 out of 5 all extremities bilaterally.  Tone is normal.  Neurologic: Awake, alert, oriented to name, place and time.  Cranial nerves II-XII are grossly intact.  Motor is 5 out of 5 bilaterally.  Sensory is intact.   Neuropsychiatric: normal mood and affect    Data   Data reviewed today: I reviewed all medications, new labs and imaging results over the last 24 hours. I personally reviewed the EKG tracing showing LBBB.    Most Recent 3 CBC's:  Recent Labs   Lab Test 03/06/19 2118 03/06/19 2117   WBC  --  8.0   HGB 15.0 15.5   MCV  --  87   PLT  --   187     Most Recent 3 BMP's:  Recent Labs   Lab Test 03/06/19 2118 03/06/19 2117    139   POTASSIUM 3.1* 3.3*   CHLORIDE 103 105   CO2  --  24   BUN 10 11   CR  --  0.92   ANIONGAP 14 10   ILENE  --  8.7   * 236*     Most Recent 2 LFT's:  Recent Labs   Lab Test 03/06/19 2117   AST 15   ALT 25   ALKPHOS 84   BILITOTAL 0.9     Most Recent 3 Troponin's:  Recent Labs   Lab Test 03/06/19 2117   TROPI <0.015     Most Recent 3 BNP's:No lab results found.  Most Recent Hemoglobin A1c:  Recent Labs   Lab Test 03/06/19 2117   A1C 6.5*     Recent Results (from the past 24 hour(s))   CT Head w/o Contrast    Narrative    CT SCAN OF THE HEAD WITHOUT CONTRAST   3/6/2019 10:16 PM     HISTORY: Sudden onset weakness.    TECHNIQUE:  Axial images of the head and coronal reformations without  IV contrast material.  Radiation dose for this scan was reduced using  automated exposure control, adjustment of the mA and/or kV according  to patient size, or iterative reconstruction technique.    COMPARISON: None.    FINDINGS: Mild cerebral atrophy is present. The brain parenchyma is  otherwise normal. There is no evidence for intracranial hemorrhage,  mass effect, acute infarct, or skull fracture.      Impression    IMPRESSION: Mild cerebral atrophy. No evidence for intracranial  hemorrhage or any acute process.    GALE BARKER MD   XR Chest 2 Views    Narrative    CHEST TWO VIEWS  3/6/2019 10:26 PM     HISTORY: Weakness.    COMPARISON: None.      Impression    IMPRESSION: Normal.    GALE BARKER MD

## 2019-03-07 NOTE — CONSULTS
"Consult Date:  03/07/2019      CARDIOLOGY CONSULTATION      REQUESTING PHYSICIAN:  Arnie Fischer MD      HISTORY OF PRESENT ILLNESS:  Benito Silva, a 63-year-old man with newly diagnosed left bundle branch block, dyslipidemia, and diabetes, was evaluated in consultation at the request of Dr. Fischer for an isolated episode of lightheadedness, near-syncope, nausea, and weakness.      Last evening at about 8:00 p.m., the patient was at a laundromat and noted the abrupt onset of lightheadedness, weakness, nausea, and sweating.  The whole episode lasted about 1 hour and has subsequently resolved.  Although there are chart references to \"chest pressure and chest discomfort,\" the patient presently denies any chest discomfort symptoms.  He specifically denies chest, arm, neck, jaw, or back discomfort with exertion.      When seen in the ER, his physicians noted a left bundle branch block.  Troponin enzymes were negative.  Because of concern regarding possible acute coronary syndrome, an echocardiogram and Cardiology consultation were requested.  Thus far, troponin enzymes are negative.  The ER physicians also requested a head CT scan which did not show any intracranial pathology.      PAST MEDICAL HISTORY:   1.  Diabetes mellitus -- recently diagnosed.  No documented end-organ disease.   2.  Dyslipidemia -- most recent lipid profile in 11/2018 showing total cholesterol of 244, a nonHDL cholesterol of 196, an LDL cholesterol of 171, and an HDL cholesterol 48 while fasting, not currently on therapy.   3.  Newly diagnosed left bundle branch block.   4.  History of patellar chondromalacia.   5.  History of nephrolithiasis.      PAST SURGICAL HISTORY:  None.      SOCIAL HISTORY:  The patient is .  He has a total of 5 children.  He is from [facility] originally, and he works as a .      HABITS:  The patient does not abuse alcohol.  He used to smoke but no longer uses tobacco.      REVIEW OF SYSTEMS:  A " 12-point review of systems was performed.  Outside the issues mentioned in the HPI, there are no other complaints.      CURRENT MEDICATIONS:  Melatonin.  Patient states he is on vitamin D, multivitamins, and an inhaler.      FAMILY HISTORY:  There is a family history of asthma, but no history of cardiac disease.      PHYSICAL EXAMINATION:   GENERAL:  Demonstrates a very pleasant, cooperative, soft spoken 63-year-old man who appears comfortable at rest.   VITAL SIGNS:  His blood pressure is 122/57 with a heart rate of 64.   LUNGS:  Clear to percussion and auscultation.   CARDIOVASCULAR:  Shows a normal S1 with a paradoxically split second heart sound.  There is a soft grade 1/6 murmur on the left sternal border.  His pulses are symmetrical in the carotid, radial, brachial, femoral, popliteal, dorsalis pedis, posterior tibials.   ABDOMEN:  Bowel sounds are present.  Liver percusses to about 7 cm.  Spleen is not palpable.  The aorta is not tender.   LOWER EXTREMITIES:  There is no swelling, cyanosis, or clubbing.   NEUROLOGIC:  Cranial nerves II-XII are intact.  Strength equal and symmetrical.   PSYCHIATRIC:  He displays normal insight and judgment.      LABORATORY DATA:  His hemoglobin is 15.0 with a white blood cell count of 8.0, platelet count 187,000.  His troponin enzymes are negative so far.  His ECG shows a sinus rhythm with a left bundle branch block.  His creatinine is 0.8.  His chest x-ray shows normal heart size with clear lungs.      ASSESSMENT:  This 63-year-old man experienced an isolated 1 hour episode of diaphoresis, nausea, lightheadedness, and near-syncope.  He did not report chest discomfort to me, but other observers obtained a history of chest discomfort at some point.  He does not describe typical angina with activity.  He has not had previous ECGs, so he has a newly diagnosed left bundle branch block.      The patient has coronary risk factors including age, gender, diabetes, and dyslipidemia.   His risk for an adverse vascular event about 15% over 10 years for which current American College of Cardiology guidelines would advise a statin drug.  Low-dose aspirin may be helpful as long as there are no bleeding complications.      I agree with plans for an echocardiogram.  A nuclear stress test would be reasonable to exclude ischemia.  If the patient's ejection fraction is normal and no evidence of ischemia is seen, no further intervention is needed at this time.  In view of his left bundle branch block, we must be concerned about the possibility of heart block as also a potential cause for symptoms, and the patient should be monitored.      RECOMMENDATIONS:   1.  Agree with monitoring for more advanced heart block.   2.  Agree with echocardiogram.   3.  Atorvastatin 40 mg a day.   4.  Aspirin 81 daily.   5.  Regadenoson nuclear stress test.  If the patient shows evidence of either left ventricular dysfunction or significant ischemia, I would advise diagnostic coronary angiography.  If there is no ischemia, ejection fraction is preserved, and there is no further arrhythmias, no further cardiac intervention is needed at this time.      We have appreciated the opportunity to see your patient, Mr. Paramjit Choi.         CLAUDE MORGAN MD             D: 2019   T: 2019   MT: RIGO      Name:     PARAMJIT CHOI   MRN:      [id number]        Account:       [id number]   :      1955           Consult Date:  2019      Document: [id number]       cc: [facility]

## 2019-03-08 VITALS
OXYGEN SATURATION: 98 % | HEART RATE: 74 BPM | RESPIRATION RATE: 18 BRPM | DIASTOLIC BLOOD PRESSURE: 58 MMHG | WEIGHT: 153.4 LBS | TEMPERATURE: 97.8 F | SYSTOLIC BLOOD PRESSURE: 112 MMHG | BODY MASS INDEX: 24.65 KG/M2 | HEIGHT: 66 IN

## 2019-03-08 LAB — GLUCOSE BLDC GLUCOMTR-MCNC: 159 MG/DL (ref 70–99)

## 2019-03-08 PROCEDURE — 00000146 ZZHCL STATISTIC GLUCOSE BY METER IP

## 2019-03-08 PROCEDURE — 25000132 ZZH RX MED GY IP 250 OP 250 PS 637: Performed by: INTERNAL MEDICINE

## 2019-03-08 PROCEDURE — 99232 SBSQ HOSP IP/OBS MODERATE 35: CPT | Performed by: INTERNAL MEDICINE

## 2019-03-08 PROCEDURE — 99239 HOSP IP/OBS DSCHRG MGMT >30: CPT | Performed by: INTERNAL MEDICINE

## 2019-03-08 RX ORDER — ASPIRIN 81 MG/1
81 TABLET, CHEWABLE ORAL DAILY
Qty: 60 TABLET | Refills: 0 | Status: SHIPPED | OUTPATIENT
Start: 2019-03-08

## 2019-03-08 RX ORDER — ATORVASTATIN CALCIUM 40 MG/1
40 TABLET, FILM COATED ORAL DAILY
Qty: 30 TABLET | Refills: 0 | Status: SHIPPED | OUTPATIENT
Start: 2019-03-08

## 2019-03-08 RX ADMIN — ASPIRIN 81 MG 81 MG: 81 TABLET ORAL at 09:27

## 2019-03-08 RX ADMIN — ATORVASTATIN CALCIUM 40 MG: 40 TABLET, FILM COATED ORAL at 09:27

## 2019-03-08 ASSESSMENT — ACTIVITIES OF DAILY LIVING (ADL)
ADLS_ACUITY_SCORE: 12

## 2019-03-08 ASSESSMENT — MIFFLIN-ST. JEOR: SCORE: 1443.57

## 2019-03-08 NOTE — PROGRESS NOTES
Cardiology addendum    Patient reported to Dr. Ardon that he changes his mind, and prefers to see Dr. Reid.   Will arrange for appointment with him.  This can be in 2-3 months with FLP/ALT.      Scheduled Thursday, May 9  7:30 AM lab  8:30 AM appt with Dr. Franklin Palmer PA-C 3/8/2019 11:41 AM

## 2019-03-08 NOTE — PROGRESS NOTES
[facility]    Cardiology Progress Note    Assessment & Plan   Benito Silva is a 63 year old male who was admitted on 3/6/2019.     1.  Newly diagnosed LBBB  2.  Dyslipidemia  10 year risk of adverse event 15%  Statin /asa begun      Recommendations.  1. Mediterranean style diet  2. Regular exercise 40 minutes 4 to 7 times /weak  3. High potency statin   4. Low dose asa 81 daily in absence of bleeding problems  5. No further cardiac testing at this time in view of very small region of abnormality on nuclear stress test. He should be re evaluated if symptoms recur.     Joshua Reid MD    Interval History   No further complaints. Anxious to go home. Plans to follow up with Reyes/Abbot  system    Physical Exam   Temp: 97.8  F (36.6  C) Temp src: Oral BP: 112/58 Pulse: 74 Heart Rate: 91 Resp: 18 SpO2: 98 % O2 Device: None (Room air)    Vitals:    03/07/19 0143 03/08/19 0654   Weight: 70.9 kg (156 lb 3.2 oz) 69.6 kg (153 lb 6.4 oz)     Vital Signs with Ranges  Temp:  [96.8  F (36  C)-98  F (36.7  C)] 97.8  F (36.6  C)  Pulse:  [74] 74  Heart Rate:  [60-91] 91  Resp:  [16-20] 18  BP: (112-134)/(58-81) 112/58  SpO2:  [96 %-99 %] 98 %  I/O last 3 completed shifts:  In: 220 [P.O.:220]  Out: -     Constitutional: Awake, alert, cooperative, no apparent distress  Lungs: Clear to auscultation bilaterally, no crackles or wheezing  Cardiovascular: Regular rate and rhythm, normal S1 and S2, and no murmur noted  Abdomen: Normal bowel sounds, soft, non-distended, non-tender  Skin: No rashes, no cyanosis, no edema  Other:       Medications       aspirin  81 mg Oral Daily     atorvastatin  40 mg Oral Daily       Data   I personally reviewed his TTE and nuclear stress test results.  Recent Labs   Lab 03/07/19  0618 03/06/19 2118 03/06/19 2117   WBC  --   --  8.0   HGB  --  15.0 15.5   MCV  --   --  87   PLT  --   --  187   INR  --   --  1.01   NA  --  142 139   POTASSIUM 4.1 3.1* 3.3*   CHLORIDE  --  103  105   CO2  --   --  24   BUN  --  10 11   CR  --   --  0.92   ANIONGAP  --  14 10   TROPI <0.015  --  <0.015   TROPONIN  --   --  0.00     Recent Results (from the past 24 hour(s))   NM MPI w Lexiscan    Narrative    GATED MYOCARDIAL PERFUSION SCINTIGRAPHY WITH INTRAVENOUS PHARMACOLOGIC  VASODILATATION LEXISCAN -ONE DAY STUDY     3/7/2019 3:31 PM  PARAMJIT CHOI  63 years  Male  1955.    Indication/Clinical History: Left bundle-branch block, syncope    Impression  1.  Myocardial perfusion imaging using single isotope technique  demonstrated the small reversible distal anteroseptal and anteroapical  defect suggesting a small area of ischemia.   2. Gated images demonstrated normal wall motion.  The left ventricular  systolic function is normal with a calculated ejection fraction of  70%.    Procedure  Pharmacologic stress testing was performed with Lexiscan at a rate of  0.08 mg/ml rapid bolus injection, for 15 seconds, 0.4 mg/5ml  intravenously. Low-level exercise was not performed along with the  vasodilator infusion.  The heart rate was 62 at baseline and levy to  107 beats per minute during the Lexiscan infusion. The rest blood  pressure was 126/62 mmHg and was 124/67 mm Hg during Lexiscan  infusion. The patient experienced shortness of breath  during the  test.    Myocardial perfusion imaging was performed at rest, approximately 45  minutes after the injection intravenously of 9.8 mCi of Tc-99m  Myoview. At peak pharmacologic effect, 10-20 seconds after Lexiscan,   the patient was injected intravenously with 30.6 mCi of  Tc-99m  Myoview. The post-stress tomographic imaging was performed  approximately 60 minutes after stress.    EKG Findings  The resting EKG demonstrated sinus rhythm with left bundle branch  block. The stress EKG demonstrated no interpretable EKG changes.    Tomographic Findings  Overall, the study quality is adequate. Body mass index 25.21 . On the  stress images, there is a small distal  defect involving the distal  antral septum and the antral apex. There is a mild reduction in  radiotracer uptake. On the rest images, normal myocardial perfusion  was present . Gated images demonstrated normal wall motion. The left  ventricular ejection fraction was calculated to be 70%. TID was  absent.    MIKEL MON MD

## 2019-03-08 NOTE — PLAN OF CARE
RN - DEMAR. Tele SR with BBB and ST depression. Denying CP. Receiving cardiology consult - instructed to follow up as an outpatient - appointments scheduled. Pt receiving diabetes education and supplies.  this AM. Receiving word from primary hospitalist pt discharging today. Telemetry removed, PIV removed. Awaiting final glucometer education and discharge order.    Patient's After Visit Summary was reviewed with patient.   Patient verbalized understanding of After Visit Summary, recommended follow up and was given an opportunity to ask questions.   Discharge medications sent home with patient/family: Prescriptions sent to preferred pharmacy   Discharged with spouse

## 2019-03-08 NOTE — DISCHARGE SUMMARY
[facility]  Hospitalist Discharge Summary       Date of Admission:  3/6/2019  Date of Discharge:  3/8/2019  3:30 PM  Discharging Provider: Raphael Ardon MD      Discharge Diagnoses   #1.  Left bundle branch block  #2.  Hypercholesterolemia  #3.  He denies weakness and near syncope  #4.  Type 2 diabetes  Type    Follow-ups Needed After Discharge   Follow-up Appointments     Follow-up and recommended labs and tests       Follow up with primary care provider, [facility], within 7   days for hospital follow- up.  The following labs/tests are recommended:   BG monitoring , follow with cardiologist in next 1-2 weeks .             Unresulted Labs Ordered in the Past 30 Days of this Admission     No orders found from 1/5/2019 to 3/7/2019.          Discharge Disposition   Discharged to home  Condition at discharge: Stable    Hospital Course   Patient is a 63-year-old [place]ian male with history of type 2 diabetes which was untreated, GERD and intermittent asthma presents with symptoms of generalized body weakness and episode of near syncope at home.  Patient was seen in the emergency department which showed evidence of left bundle branch block on EKG.  Patient was admitted and cardiologist consulted to assist with further management recommendations.  There was not evidence of acute coronary syndrome, patient was monitored on telemetry and serial troponins were unremarkable.  Echocardiogram was obtained as well as stress testing which was mildly positive.  Cardiologist recommended aspirin and statin and to follow as an outpatient.  He was recommended to follow-up with his primary care physician for better control of his diabetes and advised to follow a Mediterranean diet, regular physical activity and follow.  On the day of discharge patient was asymptomatic, felt much better and no complaint of chest pain or shortness of breath.  I spoke with Dr. Reid  of cardiology service who recommended  outpatient follow-up and no indication of further workup at this time.  Patient was given instruction regarding his diabetes control including diabetes supply and pharmacy diabetes educator also assisted with further instructions.      Consultations This Hospital Stay   CARDIOLOGY IP CONSULT    Code Status   Full Code    Time Spent on this Encounter   I, Raphael Ardon, personally saw the patient today and spent greater than 30 minutes discharging this patient.       Raphael Ardon MD  [facility]  ______________________________________________________________________    Physical Exam   Vital Signs: Temp: 97.8  F (36.6  C) Temp src: Oral BP: 112/58   Heart Rate: 91 Resp: 18 SpO2: 98 % O2 Device: None (Room air)    Weight: 153 lbs 6.4 oz         Primary Care Physician   [facility]    Immunizations       Discharge Orders      Lipid Profile     ALT    Last Lab Result: ALT (U/L)       Date                     Value                 03/06/2019               25               ----------     Follow-Up with Cardiologist      Reason for your hospital stay    Abnormal EKG -LBBB  Diabetes     Follow-up and recommended labs and tests     Follow up with primary care provider, [facility], within 7 days for hospital follow- up.  The following labs/tests are recommended: BG monitoring , follow with cardiologist in next 1-2 weeks .     Activity    Your activity upon discharge: activity as tolerated     When to contact your care team    Call your primary doctor if you have any of the following:  increased shortness of breath or increased pain.     Full Code     Diet    Follow this diet upon discharge: Cardiac and Diabetic (1800 ADA)       Significant Results and Procedures   Results for orders placed or performed during the hospital encounter of 03/06/19   XR Chest 2 Views    Narrative    CHEST TWO VIEWS  3/6/2019 10:26 PM     HISTORY: Weakness.    COMPARISON: None.      Impression     IMPRESSION: Normal.    GALE BARKER MD   CT Head w/o Contrast    Narrative    CT SCAN OF THE HEAD WITHOUT CONTRAST   3/6/2019 10:16 PM     HISTORY: Sudden onset weakness.    TECHNIQUE:  Axial images of the head and coronal reformations without  IV contrast material.  Radiation dose for this scan was reduced using  automated exposure control, adjustment of the mA and/or kV according  to patient size, or iterative reconstruction technique.    COMPARISON: None.    FINDINGS: Mild cerebral atrophy is present. The brain parenchyma is  otherwise normal. There is no evidence for intracranial hemorrhage,  mass effect, acute infarct, or skull fracture.      Impression    IMPRESSION: Mild cerebral atrophy. No evidence for intracranial  hemorrhage or any acute process.    GALE BARKER MD   NM MPI w Lexiscan    Narrative    GATED MYOCARDIAL PERFUSION SCINTIGRAPHY WITH INTRAVENOUS PHARMACOLOGIC  VASODILATATION LEXISCAN -ONE DAY STUDY     3/7/2019 3:31 PM  PARAMJIT CHOI  63 years  Male  1955.    Indication/Clinical History: Left bundle-branch block, syncope    Impression  1.  Myocardial perfusion imaging using single isotope technique  demonstrated the small reversible distal anteroseptal and anteroapical  defect suggesting a small area of ischemia.   2. Gated images demonstrated normal wall motion.  The left ventricular  systolic function is normal with a calculated ejection fraction of  70%.    Procedure  Pharmacologic stress testing was performed with Lexiscan at a rate of  0.08 mg/ml rapid bolus injection, for 15 seconds, 0.4 mg/5ml  intravenously. Low-level exercise was not performed along with the  vasodilator infusion.  The heart rate was 62 at baseline and levy to  107 beats per minute during the Lexiscan infusion. The rest blood  pressure was 126/62 mmHg and was 124/67 mm Hg during Lexiscan  infusion. The patient experienced shortness of breath  during the  test.    Myocardial perfusion imaging was performed at  rest, approximately 45  minutes after the injection intravenously of 9.8 mCi of Tc-99m  Myoview. At peak pharmacologic effect, 10-20 seconds after Lexiscan,   the patient was injected intravenously with 30.6 mCi of  Tc-99m  Myoview. The post-stress tomographic imaging was performed  approximately 60 minutes after stress.    EKG Findings  The resting EKG demonstrated sinus rhythm with left bundle branch  block. The stress EKG demonstrated no interpretable EKG changes.    Tomographic Findings  Overall, the study quality is adequate. Body mass index 25.21 . On the  stress images, there is a small distal defect involving the distal  antral septum and the antral apex. There is a mild reduction in  radiotracer uptake. On the rest images, normal myocardial perfusion  was present . Gated images demonstrated normal wall motion. The left  ventricular ejection fraction was calculated to be 70%. TID was  absent.    MIKEL MON MD     All laboratory and imaging data in the past 24 hours reviewed     Recent Labs   Lab 03/06/19 2118 03/06/19 2117   WBC  --  8.0   HGB 15.0 15.5   HCT  --  45.6   MCV  --  87   PLT  --  187     No results for input(s): CULT in the last 168 hours.  Recent Labs   Lab 03/07/19 0618 03/06/19 2118 03/06/19 2117   NA  --  142 139   POTASSIUM 4.1 3.1* 3.3*   CHLORIDE  --  103 105   CO2  --   --  24   ANIONGAP  --  14 10   GLC  --  231* 236*   BUN  --  10 11   CR  --   --  0.92   GFRESTIMATED  --  >90 89   GFRESTBLACK  --  >90 >90   ILENE  --   --  8.7   MAG  --   --  2.2   PHOS  --   --  2.6   PROTTOTAL  --   --  7.3   ALBUMIN  --   --  3.8   BILITOTAL  --   --  0.9   ALKPHOS  --   --  84   AST  --   --  15   ALT  --   --  25       Recent Labs   Lab 03/08/19  0829 03/07/19 2127 03/07/19  1131 03/07/19  0748 03/06/19 2118 03/06/19 2117 03/06/19 2116   GLC  --   --   --   --  231* 236*  --    * 156* 114* 101*  --   --  216*           Recent Labs   Lab 03/06/19  2117   INR 1.01           Recent  Labs   Lab 03/07/19  0618 03/06/19  2117   TROPONIN  --  0.00   TROPI <0.015 <0.015       Recent Results (from the past 48 hour(s))   CT Head w/o Contrast    Narrative    CT SCAN OF THE HEAD WITHOUT CONTRAST   3/6/2019 10:16 PM     HISTORY: Sudden onset weakness.    TECHNIQUE:  Axial images of the head and coronal reformations without  IV contrast material.  Radiation dose for this scan was reduced using  automated exposure control, adjustment of the mA and/or kV according  to patient size, or iterative reconstruction technique.    COMPARISON: None.    FINDINGS: Mild cerebral atrophy is present. The brain parenchyma is  otherwise normal. There is no evidence for intracranial hemorrhage,  mass effect, acute infarct, or skull fracture.      Impression    IMPRESSION: Mild cerebral atrophy. No evidence for intracranial  hemorrhage or any acute process.    GALE BARKER MD   XR Chest 2 Views    Narrative    CHEST TWO VIEWS  3/6/2019 10:26 PM     HISTORY: Weakness.    COMPARISON: None.      Impression    IMPRESSION: Normal.    GALE BARKER MD   NM MPI w Lexiscan    Narrative    GATED MYOCARDIAL PERFUSION SCINTIGRAPHY WITH INTRAVENOUS PHARMACOLOGIC  VASODILATATION LEXISCAN -ONE DAY STUDY     3/7/2019 3:31 PM  PARAMJIT CHOI  63 years  Male  1955.    Indication/Clinical History: Left bundle-branch block, syncope    Impression  1.  Myocardial perfusion imaging using single isotope technique  demonstrated the small reversible distal anteroseptal and anteroapical  defect suggesting a small area of ischemia.   2. Gated images demonstrated normal wall motion.  The left ventricular  systolic function is normal with a calculated ejection fraction of  70%.    Procedure  Pharmacologic stress testing was performed with Lexiscan at a rate of  0.08 mg/ml rapid bolus injection, for 15 seconds, 0.4 mg/5ml  intravenously. Low-level exercise was not performed along with the  vasodilator infusion.  The heart rate was 62 at baseline  and levy to  107 beats per minute during the Lexiscan infusion. The rest blood  pressure was 126/62 mmHg and was 124/67 mm Hg during Lexiscan  infusion. The patient experienced shortness of breath  during the  test.    Myocardial perfusion imaging was performed at rest, approximately 45  minutes after the injection intravenously of 9.8 mCi of Tc-99m  Myoview. At peak pharmacologic effect, 10-20 seconds after Lexiscan,   the patient was injected intravenously with 30.6 mCi of  Tc-99m  Myoview. The post-stress tomographic imaging was performed  approximately 60 minutes after stress.    EKG Findings  The resting EKG demonstrated sinus rhythm with left bundle branch  block. The stress EKG demonstrated no interpretable EKG changes.    Tomographic Findings  Overall, the study quality is adequate. Body mass index 25.21 . On the  stress images, there is a small distal defect involving the distal  antral septum and the antral apex. There is a mild reduction in  radiotracer uptake. On the rest images, normal myocardial perfusion  was present . Gated images demonstrated normal wall motion. The left  ventricular ejection fraction was calculated to be 70%. TID was  absent.    MIKEL MON MD             Discharge Medications   Discharge Medication List as of 3/8/2019  2:42 PM      START taking these medications    Details   aspirin (ASA) 81 MG chewable tablet Take 1 tablet (81 mg) by mouth daily, Disp-60 tablet, R-0, E-Prescribe      atorvastatin (LIPITOR) 40 MG tablet Take 1 tablet (40 mg) by mouth daily, Disp-30 tablet, R-0, E-Prescribe         CONTINUE these medications which have NOT CHANGED    Details   fluticasone (FLONASE) 50 MCG/ACT nasal spray Spray 2 sprays in nostril daily , Historical      Multiple Vitamins-Minerals (MULTI ADULT GUMMIES) CHEW Take 1 tablet by mouth daily as needed, Historical      vitamin D3 (CHOLECALCIFEROL) 2000 units tablet Take 1 tablet by mouth daily as needed, Historical           Allergies   No  Known Allergies

## 2019-03-08 NOTE — PHARMACY - DISCHARGE MEDICATION RECONCILIATION AND EDUCATION
Discharge medication review for this patient is complete. Face-to-face medication education was provided by the pharmacist.  See [facility] for allergy information and immunization status.   Pharmacist assisted with medication reconciliation of discharge medications with PTA medications.    Patient was informed to STOP taking the following HOME medications:   -None    Patient was informed to START taking the following NEW medications:   -asa, atorvastatin    Patient was informed to make the following changes to prior to admission medications:  -None    Patient was educated on the following for each discharge medication:   Rationale for therapy  Duration of treatment  Dosing and or monitoring drug levels  Common side effects  Importance of compliance  Drug/food interactions  Missed doses  Self monitoring parameters    Left written materials and instructions (Clinical notes from [facility]) for new medications: Yes- Understanding diabetes    OUTCOMES: Patient verbalized understanding    IMPORTANT FOLLOW UP NOTES:   - New diabetic, MD requested diabetes education  Reviewed diabetic education focusing on healthy diet & glucose monitoring  Current A1C 6.5. Goal A1C <7  Pt will check BG twice daily before meals and at bedtime - patient will rotate times of day  Pt will keep a BG log and take to clinic visit  Pt will keep to 3-5 carb choices per meal     Discharge Medication List     Review of your medicines      START taking      Dose / Directions   aspirin 81 MG chewable tablet  Commonly known as:  ASA  Used for:  Atypical chest pain      Dose:  81 mg  Take 1 tablet (81 mg) by mouth daily  Quantity:  60 tablet  Refills:  0     atorvastatin 40 MG tablet  Commonly known as:  LIPITOR      Dose:  40 mg  Take 1 tablet (40 mg) by mouth daily  Quantity:  30 tablet  Refills:  0        CONTINUE these medicines which have NOT CHANGED      Dose / Directions   fluticasone 50 MCG/ACT nasal spray  Commonly known as:  FLONASE      Dose:  2  spray  Spray 2 sprays in nostril daily  Refills:  0     MULTI ADULT GUMMIES Chew      Dose:  1 tablet  Take 1 tablet by mouth daily as needed  Refills:  0     vitamin D3 2000 units tablet  Commonly known as:  CHOLECALCIFEROL      Dose:  1 tablet  Take 1 tablet by mouth daily as needed  Refills:  0           Where to get your medicines      These medications were sent to [id number] - [place], [place] - [address]  [address] [facility] [address]    Phone:  [phone]     aspirin 81 MG chewable tablet    atorvastatin 40 MG tablet

## 2019-03-08 NOTE — PROGRESS NOTES
Patient seen and examined by me today .Medical records reviewed and plan of care from the previous attending physician  earlier today was discussed with the patient.  Patient/family  Questions and concerns addressed.  Patient has no complaints.No chest pain or sob   Seen by cardiology and stress test done and result reviewed   No report of positive test on the test result   Tele showed LBBB and ST depression pronounced in inferior leads   Trops negative and Echo unremarkable   Plan  - continue to monitor on tele   - cardiology paged for further recommendations   Addendum  -I spoke with Dr Vitalwho is on call cardiologist regarding the EKG changes .Dr Vital recommended to monitor for now and to get cardiologist to see him tomorrow.

## 2019-03-08 NOTE — PLAN OF CARE
VSS overnight on RA. Pt resting in bed between cares, up independently in room to bathroom. Denies pain, CP, SOB/SHAVER. On low sat fat diet, tolerating well. PIV SL x2, voiding without saving. BG at . Tele SR w/BBB and ST depression. Dr. Reva corral, assessed pt at bedside, see corresponding note. Up ad angel, steady gait. Spouse supportive and at bedside, involved. Alert and oriented, able to make needs known. Continue to monitor.

## 2019-03-11 LAB — INTERPRETATION ECG - MUSE: NORMAL

## 2019-03-12 ENCOUNTER — TELEPHONE (OUTPATIENT)
Dept: CARDIOLOGY | Facility: CLINIC | Age: 64
End: 2019-03-12

## 2019-03-12 NOTE — TELEPHONE ENCOUNTER
Patient was evaluated by cardiology while inpatient for episode of lightheadedness, near syncope, weakness and nausea with new LBBB. Cardiology consult per Dr. Reid. Nuclear stress test completed and cardiology discharge plan of care as below. Called patient to discuss any post hospital d/c questions he may have, review medication changes, and confirm f/u appts. Patient denied any questions regarding new medications or changes to PTA medications. Patient denied any SOB, chest pain, or light headedness. RN confirmed with patient that he has an apt scheduled on 5/9/19 with Dr. Reid, with labs prior. Patient advised to call clinic with any cardiac related questions or concerns prior to this jana't. Patient verbalized understanding and agreed with plan. KEESHA Corona RN.      Recommendations.  1. Mediterranean style diet  2. Regular exercise 40 minutes 4 to 7 times /weak  3. High potency statin   4. Low dose asa 81 daily in absence of bleeding problems  5. No further cardiac testing at this time in view of very small region of abnormality on nuclear stress test. He should be re evaluated if symptoms recur.      Joshua Reid MD

## 2019-05-07 PROBLEM — E78.5 HLD (HYPERLIPIDEMIA): Status: ACTIVE | Noted: 2019-03-06

## 2019-05-07 PROBLEM — I44.7 LBBB (LEFT BUNDLE BRANCH BLOCK): Status: ACTIVE | Noted: 2019-03-06

## 2019-05-09 ENCOUNTER — OFFICE VISIT (OUTPATIENT)
Dept: CARDIOLOGY | Facility: CLINIC | Age: 64
End: 2019-05-09
Attending: PHYSICIAN ASSISTANT
Payer: COMMERCIAL

## 2019-05-09 VITALS
WEIGHT: 158.4 LBS | DIASTOLIC BLOOD PRESSURE: 66 MMHG | BODY MASS INDEX: 25.46 KG/M2 | SYSTOLIC BLOOD PRESSURE: 128 MMHG | HEIGHT: 66 IN | HEART RATE: 80 BPM

## 2019-05-09 DIAGNOSIS — E78.5 DYSLIPIDEMIA: Primary | ICD-10-CM

## 2019-05-09 DIAGNOSIS — I44.7 NEW ONSET LEFT BUNDLE BRANCH BLOCK (LBBB): ICD-10-CM

## 2019-05-09 DIAGNOSIS — E78.00 HIGH CHOLESTEROL: ICD-10-CM

## 2019-05-09 LAB
ALT SERPL W P-5'-P-CCNC: 23 U/L (ref 0–70)
CHOLEST SERPL-MCNC: 197 MG/DL
HDLC SERPL-MCNC: 46 MG/DL
LDLC SERPL CALC-MCNC: 130 MG/DL
NONHDLC SERPL-MCNC: 151 MG/DL
TRIGL SERPL-MCNC: 107 MG/DL

## 2019-05-09 PROCEDURE — 80061 LIPID PANEL: CPT | Performed by: PHYSICIAN ASSISTANT

## 2019-05-09 PROCEDURE — 84460 ALANINE AMINO (ALT) (SGPT): CPT | Performed by: PHYSICIAN ASSISTANT

## 2019-05-09 PROCEDURE — 36415 COLL VENOUS BLD VENIPUNCTURE: CPT | Performed by: PHYSICIAN ASSISTANT

## 2019-05-09 PROCEDURE — 99213 OFFICE O/P EST LOW 20 MIN: CPT | Performed by: INTERNAL MEDICINE

## 2019-05-09 ASSESSMENT — MIFFLIN-ST. JEOR: SCORE: 1466.25

## 2019-05-09 NOTE — PROGRESS NOTES
"HISTORY OF PRESENT ILLNESS:  NO symptoms. Nuclear stress test low risk . Is exercising regularly and follows. Mediterranean diet. He is presently not taking atorvastatin.    Orders this Visit:  No orders of the defined types were placed in this encounter.    No orders of the defined types were placed in this encounter.    There are no discontinued medications.    Encounter Diagnosis   Name Primary?     New onset left bundle branch block (LBBB)        CURRENT MEDICATIONS:  Current Outpatient Medications   Medication Sig Dispense Refill     aspirin (ASA) 81 MG chewable tablet Take 1 tablet (81 mg) by mouth daily 60 tablet 0     atorvastatin (LIPITOR) 40 MG tablet Take 1 tablet (40 mg) by mouth daily 30 tablet 0     fluticasone (FLONASE) 50 MCG/ACT nasal spray Spray 2 sprays in nostril daily        Multiple Vitamins-Minerals (MULTI ADULT GUMMIES) CHEW Take 1 tablet by mouth daily as needed       vitamin D3 (CHOLECALCIFEROL) 2000 units tablet Take 1 tablet by mouth daily as needed         ALLERGIES     Allergies   Allergen Reactions     Melon      Itchy throat       PAST MEDICAL, SURGICAL, FAMILY, SOCIAL HISTORY:  History was reviewed and updated as needed, see medical record.    Review of Systems:  A 12-point review of systems was completed, see medical record for detailed review of systems information.    Physical Exam:  Vitals: /66 (BP Location: Right arm, Patient Position: Chair, Cuff Size: Adult Regular)   Pulse 80   Ht 1.676 m (5' 6\")   Wt 71.8 kg (158 lb 6.4 oz)   BMI 25.57 kg/m      Constitutional:           Skin:           Head:           Eyes:           ENT:           Neck:           Chest:           Cardiac:                    Abdomen:           Vascular:                                        Extremities and Back:           Neurological:           ASSESSMENT:  Stable. Would not advise further testing. Needs to be on statin       RECOMMENDATIONS:  1) resume statin.   Check lipids with pirmary   2)  " Follow-up PRN      Recent Lab Results:  LIPID RESULTS:  Lab Results   Component Value Date    CHOL 197 05/09/2019    HDL 46 05/09/2019     (H) 05/09/2019    TRIG 107 05/09/2019       LIVER ENZYME RESULTS:  Lab Results   Component Value Date    AST 15 03/06/2019    ALT 23 05/09/2019       CBC RESULTS:  Lab Results   Component Value Date    WBC 8.0 03/06/2019    RBC 5.22 03/06/2019    HGB 15.0 03/06/2019    HCT 45.6 03/06/2019    MCV 87 03/06/2019    MCH 29.7 03/06/2019    MCHC 34.0 03/06/2019    RDW 12.6 03/06/2019     03/06/2019       BMP RESULTS:  Lab Results   Component Value Date     03/06/2019    POTASSIUM 4.1 03/07/2019    CHLORIDE 103 03/06/2019    CO2 24 03/06/2019    ANIONGAP 14 03/06/2019     (H) 03/06/2019    BUN 10 03/06/2019    CR 0.92 03/06/2019    GFRESTIMATED >90 03/06/2019    GFRESTBLACK >90 03/06/2019    ILENE 8.7 03/06/2019        A1C RESULTS:  Lab Results   Component Value Date    A1C 6.5 (H) 03/06/2019       INR RESULTS:  Lab Results   Component Value Date    INR 1.01 03/06/2019       We greatly appreciate the opportunity to be involved in the care of your patient, Benito Silva.    Sincerely,  Joshua Reid MD      CC  Edwardo Palmer, PA-C  [address] [place] [address]

## 2019-05-09 NOTE — LETTER
"5/9/2019    Mukul Canas MD  [facility] [address]  [facility] [address]    RE: Benito Silva       Dear Colleague,    I had the pleasure of seeing Benito Silva in the [facility].    HISTORY OF PRESENT ILLNESS:  NO symptoms. Nuclear stress test low risk . Is exercising regularly and follows. Mediterranean diet. He is presently not taking atorvastatin.    Orders this Visit:  No orders of the defined types were placed in this encounter.    No orders of the defined types were placed in this encounter.    There are no discontinued medications.    Encounter Diagnosis   Name Primary?     New onset left bundle branch block (LBBB)        CURRENT MEDICATIONS:  Current Outpatient Medications   Medication Sig Dispense Refill     aspirin (ASA) 81 MG chewable tablet Take 1 tablet (81 mg) by mouth daily 60 tablet 0     atorvastatin (LIPITOR) 40 MG tablet Take 1 tablet (40 mg) by mouth daily 30 tablet 0     fluticasone (FLONASE) 50 MCG/ACT nasal spray Spray 2 sprays in nostril daily        Multiple Vitamins-Minerals (MULTI ADULT GUMMIES) CHEW Take 1 tablet by mouth daily as needed       vitamin D3 (CHOLECALCIFEROL) 2000 units tablet Take 1 tablet by mouth daily as needed         ALLERGIES     Allergies   Allergen Reactions     Melon      Itchy throat       PAST MEDICAL, SURGICAL, FAMILY, SOCIAL HISTORY:  History was reviewed and updated as needed, see medical record.    Review of Systems:  A 12-point review of systems was completed, see medical record for detailed review of systems information.    Physical Exam:  Vitals: /66 (BP Location: Right arm, Patient Position: Chair, Cuff Size: Adult Regular)   Pulse 80   Ht 1.676 m (5' 6\")   Wt 71.8 kg (158 lb 6.4 oz)   BMI 25.57 kg/m       Constitutional:           Skin:           Head:           Eyes:           ENT:           Neck:           Chest:           Cardiac:                    Abdomen:           Vascular:               "                          Extremities and Back:           Neurological:           ASSESSMENT:  Stable. Would not advise further testing. Needs to be on statin       RECOMMENDATIONS:  1) resume statin.   Check lipids with pirmary   2)  Follow-up PRN      Recent Lab Results:  LIPID RESULTS:  Lab Results   Component Value Date    CHOL 197 05/09/2019    HDL 46 05/09/2019     (H) 05/09/2019    TRIG 107 05/09/2019       LIVER ENZYME RESULTS:  Lab Results   Component Value Date    AST 15 03/06/2019    ALT 23 05/09/2019       CBC RESULTS:  Lab Results   Component Value Date    WBC 8.0 03/06/2019    RBC 5.22 03/06/2019    HGB 15.0 03/06/2019    HCT 45.6 03/06/2019    MCV 87 03/06/2019    MCH 29.7 03/06/2019    MCHC 34.0 03/06/2019    RDW 12.6 03/06/2019     03/06/2019       BMP RESULTS:  Lab Results   Component Value Date     03/06/2019    POTASSIUM 4.1 03/07/2019    CHLORIDE 103 03/06/2019    CO2 24 03/06/2019    ANIONGAP 14 03/06/2019     (H) 03/06/2019    BUN 10 03/06/2019    CR 0.92 03/06/2019    GFRESTIMATED >90 03/06/2019    GFRESTBLACK >90 03/06/2019    ILENE 8.7 03/06/2019        A1C RESULTS:  Lab Results   Component Value Date    A1C 6.5 (H) 03/06/2019       INR RESULTS:  Lab Results   Component Value Date    INR 1.01 03/06/2019       We greatly appreciate the opportunity to be involved in the care of your patient, Benito Silva.    Sincerely,  Joshua Reid MD      CC  Edwardo Palmer PA-C  [address] [place] [address]                                                                       Service Date: 05/09/2019      HISTORY OF PRESENT ILLNESS:  Benito Silva,  a 63-year-old man with recently diagnosed left bundle branch block in the setting of diabetes and dyslipidemia, was seen today at your request for followup.      Mr. Silva was hospitalized at the [facility] on 03/06/2019 for an episode of lightheadedness, weakness and nausea.  The patient ruled out for  myocardial infarction.  His ECG showed left bundle branch block, which had not been seen on previous electrocardiograms.  An echocardiogram was normal.  Nuclear stress testing showed an ejection fraction of 70%.  There was a small reversible distal anteroseptal and apical defect.  The patient was discharged on aspirin, statin therapy and a followup visit was arranged for today.      Since last being seen, Mr. Silva remains entirely free of cardiovascular symptoms.  He exercises on a regular basis and is now following a Mediterranean style diet.      There has been no recurrence of the symptoms originally brought him into [facility].  He admits he has not been taking his atorvastatin.      PAST MEDICAL HISTORY:   1.  Left bundle branch block.   2.  Dyslipidemia.   3.  Diabetes mellitus.      PHYSICAL EXAMINATION:   GENERAL:  Physical exam today demonstrates a very pleasant and cooperative 63-year-old man accompanied by his wife.   VITAL SIGNS:  His blood pressure was 128/66, heart rate is 80.  His height is 1.7 meters, his weight is 71 kg.  BMI is 25.6.   LUNGS:  Clear to percussion and auscultation.   CARDIOVASCULAR:  Shows a normal S1 with a normal S2.  There is no S3.  There is no murmur, rub or click.      LABORATORY STUDIES:  Total cholesterol of 197 with HDL of 46, LDL of 130.      ASSESSMENT:  Mr. Silva is asymptomatic with optimal systolic blood pressure. In view of ACC risk calculation of 15%  for adverse vascular event over 10 years, in addition to aggressive lifestyle intervention, I have advised atorvastatin.   His nuclear stress test is low risk and in the absence of new symptoms, further cardiac testing is unlikely to change our management. He should be evaluated again should he experience recurrent symptoms.           RECOMMENDATIONS:   1.  Mediterranean style diet.   2.  Regular exercise at least 40 minutes 4-7 times a week.   3.  Continue aspirin 81 daily.   4.  Continue  atorvastatin 40 mg daily as previously prescribed.   5.  Followup visit with Dr. Canas is planned in July.  I have asked the patient to have a repeat lipid profile with Dr. Cansa at that time.      We can see the patient a p.r.n. basis, but would be happy to see him earlier if there are any new questions or concerns.      We greatly appreciate the opportunity to care for this most pleasant man.      cc:   [place] [address]         JOSHUA MORGAN MD             D: 2019   T: 2019   MT: COLE      Name:     PARAMJIT CHOI   MRN:      [id number]        Account:      [id number]   :      1955           Service Date: 2019      Document: [id number]        Thank you for allowing me to participate in the care of your patient.      Sincerely,     Joshua oMrgan MD     [facility]

## 2019-05-09 NOTE — PROGRESS NOTES
Service Date: 05/09/2019      HISTORY OF PRESENT ILLNESS:  Benito Silva,  a 63-year-old man with recently diagnosed left bundle branch block in the setting of diabetes and dyslipidemia, was seen today at your request for followup.      Mr. Silva was hospitalized at the [facility] on 03/06/2019 for an episode of lightheadedness, weakness and nausea.  The patient ruled out for myocardial infarction.  His ECG showed left bundle branch block, which had not been seen on previous electrocardiograms.  An echocardiogram was normal.  Nuclear stress testing showed an ejection fraction of 70%.  There was a small reversible distal anteroseptal and apical defect.  The patient was discharged on aspirin, statin therapy and a followup visit was arranged for today.      Since last being seen, Mr. Silva remains entirely free of cardiovascular symptoms.  He exercises on a regular basis and is now following a Mediterranean style diet.      There has been no recurrence of the symptoms originally brought him into atrial fibrillation [facility].  He admits he has not been taking his atorvastatin.      PAST MEDICAL HISTORY:   1.  Left bundle branch block.   2.  Dyslipidemia.   3.  Diabetes mellitus.      PHYSICAL EXAMINATION:   GENERAL:  Physical exam today demonstrates a very pleasant and cooperative 63-year-old man accompanied by his wife.   VITAL SIGNS:  His blood pressure was 128/66, heart rate is 80.  His height is 1.7 meters, his weight is 71 kg.  BMI is 25.6.   LUNGS:  Clear to percussion and auscultation.   CARDIOVASCULAR:  Shows a normal S1 with a normal S2.  There is no S3.  There is no murmur, rub or click.      LABORATORY STUDIES:  Total cholesterol of 197 with HDL of 46, LDL of 130.      ASSESSMENT:  Mr. Silva is asymptomatic with optimal systolic blood pressure. In view of ACC risk calculation of 15%  for adverse vascular event over 10 years, in addition to aggressive lifestyle intervention, I have advised  atorvastatin.   His nuclear stress test is low risk and in the absence of new symptoms, further cardiac testing is unlikely to change our management. He should be evaluated again should he experience recurrent symptoms.           RECOMMENDATIONS:   1.  Mediterranean style diet.   2.  Regular exercise at least 40 minutes 4-7 times a week.   3.  Continue aspirin 81 daily.   4.  Continue atorvastatin 40 mg daily as previously prescribed.   5.  Followup visit with Dr. Canas is planned in July.  I have asked the patient to have a repeat lipid profile with Dr. Canas at that time.      We can see the patient a p.r.n. basis, but would be happy to see him earlier if there are any new questions or concerns.      We greatly appreciate the opportunity to care for this most pleasant man.      cc:   [place] [address]         CLAUDE MORGAN MD             D: 2019   T: 2019   MT: COLE      Name:     PARAMJIT CHOI   MRN:      [id number]        Account:      [id number]   :      1955           Service Date: 2019      Document: [id number]